# Patient Record
Sex: MALE | Race: WHITE | NOT HISPANIC OR LATINO | URBAN - METROPOLITAN AREA
[De-identification: names, ages, dates, MRNs, and addresses within clinical notes are randomized per-mention and may not be internally consistent; named-entity substitution may affect disease eponyms.]

---

## 2017-08-04 ENCOUNTER — TELEPHONE (OUTPATIENT)
Dept: TRANSPLANT | Facility: CLINIC | Age: 57
End: 2017-08-04

## 2017-08-04 NOTE — TELEPHONE ENCOUNTER
"Living Kidney Donor Evaluation Completed: Aug 03, 2017 09:34:53 CT Updated: Aug 04, 2017 08:29:59 CT  Donor Name: Salvador Sauer MRN: Note: : 1960 Age: 56Gender: Male Donor Height: 5  11\" Weight (lb): 190 BMI: 26.5  Donor Race:  Ethnicity: Not / Donor Preferred Language: English  Required?: No Current Marital Status:   Demographics: Home Address: [address] [place] [address] City: [place] State: [place] Zip: [address] Country: [place]  Best Phone: [phone] Alt Phone: Donor Email: [email or web address] Best Phone Type: Mobile Alt Phone Type:   Preferred Contact Time(s): 09:00 AM-11:00 AM, 11:00 AM-1:00 PM, 1:00 PM-4:00 PM Preferred Contact Day(s): Mon, Tue, Wed, Thur, Fri  Donor Screen: PASSED Donor Referred by: T[place] Candidate Donor self reported ABO: O  Recipient Information: Recipient Name (Last, First): Pascual Sauer Recipient :    1961  ... Donor Relationship: Full Sibling Recipient Diagnosis: Recipient ABO:   MEDICAL HISTORY:  None Reported  MEDICATIONS:  None Reported  SURGICAL HISTORY:  None Reported  ALLERGIES:  NKDA  SOCIAL HISTORY:  EtOH: Denies  Illicit Drug Use: Denies  Tobacco: Denies  SELF-REPORTED FUNCTIONAL STATUS:  \"I am able to participate in strenuous sports such as swimming, singles tennis, football, basketball, or skiing\"  Exercise (2 X per week)  REVIEW OF ORGAN SYSTEMS: Airway or Lungs: No Blood Disorder: No Cancer: No Diabetes,Thyroid,Adrenal,Endocrine Disorder: No Digestive or Liver: No Heart or Circulatory System: No Immune Diseases: No Kidneys and Bladder: No Male Health: No Muscles,Bones,Joints: No Neuro: No Psych: No  FAMILY HISTORY: Confirmed:  Heart Disease (Father)  Hypertension (Father)  Kidney Disease (Sibling)  Denied:  Cancer (denies)  Diabetes (denies)  Kidney Stones (denies)  DONOR INFORMATION:  Level of Education: College or baccalaureate degree complete Employment Status: Full Time Employer: [employer] Medical Insurance Status: Has " medical insurance Current Accommodation: Owns own home/apartment Living Arrangement: With spouse Allow Disclosure to Recipient: Yes Paired Kidney Exchange Education Level: General idea Paired Kidney Exchange Participation Consent: Unsure Donor Motivation: Unsure, has questions  HIGH RISK BEHAVIOR:  Blood transfusion < 12 months. (NO)  Commercial sex < 12 months. (NO)  Illicit IV drug use < 5yrs. (NO)  Male:male sexual contact < 5yrs. (NO)  Other high risk sexual contact < 12 months. (NO)  EMERGENCY CONTACT INFORMATION:  Primary Secondary First Name: Kaelyn Last Name: Camacho Phone Number: [phone] Relationship: Child  First Name: Aleta  Last Name: Camacho Phone Number: [phone] Relationship: Spouse  REASON FOR DONATION:   Interested in donating a kidney to my sister Pascual who is in need.  PHYSICIAN CONTACT INFORMATION:  PCP  Name:    City: State:   Phone:   ADDITIONAL NOTES:

## 2017-08-29 ENCOUNTER — TELEPHONE (OUTPATIENT)
Dept: TRANSPLANT | Facility: CLINIC | Age: 57
End: 2017-08-29

## 2017-08-29 NOTE — TELEPHONE ENCOUNTER
Informed Salvador his nnfpdrr=659 and A1C=5.7. Will wait 2 weeks then repeat tests.Wasn't sure if fasted long enough. No DM in family. Has lost wt past year.Recent H&P done and was OK.Sent orders in [place] [facility].Average GFR=88.

## 2017-09-15 ENCOUNTER — TELEPHONE (OUTPATIENT)
Dept: TRANSPLANT | Facility: CLINIC | Age: 57
End: 2017-09-15

## 2017-09-15 NOTE — TELEPHONE ENCOUNTER
Informed Vinny of his repeat labs. Told him we cannot cont the process. Needs to go to his PCP to get checked out.Understood.